# Patient Record
Sex: FEMALE | Race: WHITE | NOT HISPANIC OR LATINO | Employment: OTHER | URBAN - METROPOLITAN AREA
[De-identification: names, ages, dates, MRNs, and addresses within clinical notes are randomized per-mention and may not be internally consistent; named-entity substitution may affect disease eponyms.]

---

## 2019-01-03 RX ORDER — GABAPENTIN 300 MG/1
CAPSULE ORAL
Qty: 270 CAPSULE | Refills: 0 | Status: SHIPPED | OUTPATIENT
Start: 2019-01-03 | End: 2019-01-03 | Stop reason: SDUPTHER

## 2019-01-03 RX ORDER — GABAPENTIN 300 MG/1
CAPSULE ORAL
Qty: 270 CAPSULE | Refills: 0 | Status: SHIPPED | OUTPATIENT
Start: 2019-01-03 | End: 2019-04-15 | Stop reason: SDUPTHER

## 2019-01-03 RX ORDER — GABAPENTIN 300 MG/1
300 CAPSULE ORAL 3 TIMES DAILY
COMMUNITY
End: 2019-01-03 | Stop reason: SDUPTHER

## 2019-01-03 NOTE — TELEPHONE ENCOUNTER
PT CALLED IN REQUESTING A REFILL FOR HER GABAPENTIN 300MG   3BID. PT IS A [facility].   SENT TO [facility]

## 2019-02-07 RX ORDER — CARVEDILOL 25 MG/1
TABLET ORAL
Qty: 180 TABLET | Refills: 3 | Status: SHIPPED | OUTPATIENT
Start: 2019-02-07 | End: 2020-01-28

## 2019-04-09 RX ORDER — LOVASTATIN 40 MG/1
TABLET ORAL
Qty: 90 TABLET | Refills: 1 | Status: SHIPPED | OUTPATIENT
Start: 2019-04-09 | End: 2019-08-28 | Stop reason: SDUPTHER

## 2019-04-15 RX ORDER — GABAPENTIN 300 MG/1
CAPSULE ORAL
Qty: 270 CAPSULE | Refills: 0 | Status: SHIPPED | OUTPATIENT
Start: 2019-04-15 | End: 2019-04-29 | Stop reason: SDUPTHER

## 2019-04-15 NOTE — TELEPHONE ENCOUNTER
DAUGHTER CALLED FOR REFILL GABAPENTIN 300 MG TAKE 1 IN AM AND 2 IN THE PM AND WANTS A 90 DAY  SUPPLY CALLED IN TO THE PHARMACY ON [address] IN [place]. NEEDS MOHSEN

## 2019-04-16 RX ORDER — BLOOD SUGAR DIAGNOSTIC
STRIP MISCELLANEOUS
Qty: 100 EACH | Refills: 11 | Status: SHIPPED | OUTPATIENT
Start: 2019-04-16 | End: 2019-10-02 | Stop reason: SDUPTHER

## 2019-04-16 RX ORDER — GLIPIZIDE 10 MG/1
TABLET ORAL
Qty: 180 TABLET | Refills: 1 | Status: SHIPPED | OUTPATIENT
Start: 2019-04-16 | End: 2019-08-28 | Stop reason: SDUPTHER

## 2019-04-17 PROBLEM — I50.32 CHRONIC DIASTOLIC HEART FAILURE (HCC): Status: ACTIVE | Noted: 2019-04-17

## 2019-04-17 PROBLEM — E78.5 HYPERLIPIDEMIA: Status: ACTIVE | Noted: 2019-04-17

## 2019-04-17 PROBLEM — E11.22 HYPERTENSION ASSOCIATED WITH STAGE 3 CHRONIC KIDNEY DISEASE DUE TO TYPE 2 DIABETES MELLITUS (HCC): Status: ACTIVE | Noted: 2019-04-17

## 2019-04-17 PROBLEM — N18.30 HYPERTENSION ASSOCIATED WITH STAGE 3 CHRONIC KIDNEY DISEASE DUE TO TYPE 2 DIABETES MELLITUS (HCC): Status: ACTIVE | Noted: 2019-04-17

## 2019-04-17 PROBLEM — E11.9 TYPE 2 DIABETES MELLITUS (HCC): Status: ACTIVE | Noted: 2019-04-17

## 2019-04-17 PROBLEM — I35.0 AORTIC VALVE STENOSIS: Status: ACTIVE | Noted: 2019-04-17

## 2019-04-17 PROBLEM — M25.561 KNEE PAIN, RIGHT: Status: ACTIVE | Noted: 2019-04-17

## 2019-04-17 PROBLEM — I25.10 ASHD (ARTERIOSCLEROTIC HEART DISEASE): Status: ACTIVE | Noted: 2019-04-17

## 2019-04-17 PROBLEM — I12.9 HYPERTENSION ASSOCIATED WITH STAGE 3 CHRONIC KIDNEY DISEASE DUE TO TYPE 2 DIABETES MELLITUS (HCC): Status: ACTIVE | Noted: 2019-04-17

## 2019-04-17 PROBLEM — I10 BENIGN ESSENTIAL HYPERTENSION: Status: ACTIVE | Noted: 2019-04-17

## 2019-04-17 PROBLEM — I20.9 ANGINA PECTORIS (HCC): Status: ACTIVE | Noted: 2019-04-17

## 2019-04-17 PROBLEM — E11.21 DIABETIC RENAL DISEASE (HCC): Status: ACTIVE | Noted: 2019-04-17

## 2019-04-17 PROBLEM — Z00.00 ANNUAL PHYSICAL EXAM: Status: ACTIVE | Noted: 2019-04-17

## 2019-04-17 PROBLEM — G62.9 PERIPHERAL NEUROPATHY: Status: ACTIVE | Noted: 2019-04-17

## 2019-04-17 PROBLEM — M10.9 GOUT, UNSPECIFIED: Status: ACTIVE | Noted: 2019-04-17

## 2019-04-17 PROBLEM — E11.42 DIABETIC SENSORY POLYNEUROPATHY (HCC): Status: ACTIVE | Noted: 2019-04-17

## 2019-04-17 PROBLEM — E66.01 MORBID OBESITY WITH BMI OF 40.0-44.9, ADULT (HCC): Status: ACTIVE | Noted: 2019-04-17

## 2019-04-17 PROBLEM — E55.9 VITAMIN D DEFICIENCY: Status: ACTIVE | Noted: 2019-04-17

## 2019-04-17 PROBLEM — I11.9 HYPERTENSIVE HEART DISEASE: Status: ACTIVE | Noted: 2019-04-17

## 2019-04-17 PROBLEM — I87.2 PERIPHERAL VENOUS INSUFFICIENCY: Status: ACTIVE | Noted: 2019-04-17

## 2019-04-29 ENCOUNTER — LAB REQUISITION (OUTPATIENT)
Dept: LAB | Facility: HOSPITAL | Age: 84
End: 2019-04-29

## 2019-04-29 ENCOUNTER — OFFICE VISIT (OUTPATIENT)
Dept: INTERNAL MEDICINE | Facility: CLINIC | Age: 84
End: 2019-04-29

## 2019-04-29 VITALS
HEIGHT: 62 IN | HEART RATE: 60 BPM | WEIGHT: 247 LBS | SYSTOLIC BLOOD PRESSURE: 158 MMHG | BODY MASS INDEX: 45.45 KG/M2 | DIASTOLIC BLOOD PRESSURE: 50 MMHG

## 2019-04-29 DIAGNOSIS — Z00.00 ROUTINE GENERAL MEDICAL EXAMINATION AT A HEALTH CARE FACILITY: ICD-10-CM

## 2019-04-29 DIAGNOSIS — E11.21 DIABETIC NEPHROPATHY ASSOCIATED WITH TYPE 2 DIABETES MELLITUS (HCC): ICD-10-CM

## 2019-04-29 DIAGNOSIS — E55.9 VITAMIN D DEFICIENCY: ICD-10-CM

## 2019-04-29 DIAGNOSIS — I10 BENIGN ESSENTIAL HYPERTENSION: ICD-10-CM

## 2019-04-29 DIAGNOSIS — E78.2 MIXED HYPERLIPIDEMIA: ICD-10-CM

## 2019-04-29 DIAGNOSIS — Z00.00 PREVENTATIVE HEALTH CARE: Primary | ICD-10-CM

## 2019-04-29 DIAGNOSIS — M1A.09X0 IDIOPATHIC CHRONIC GOUT OF MULTIPLE SITES WITHOUT TOPHUS: ICD-10-CM

## 2019-04-29 PROCEDURE — 96160 PT-FOCUSED HLTH RISK ASSMT: CPT | Performed by: INTERNAL MEDICINE

## 2019-04-29 PROCEDURE — 36415 COLL VENOUS BLD VENIPUNCTURE: CPT | Performed by: INTERNAL MEDICINE

## 2019-04-29 PROCEDURE — G0439 PPPS, SUBSEQ VISIT: HCPCS | Performed by: INTERNAL MEDICINE

## 2019-04-29 RX ORDER — BUMETANIDE 2 MG/1
1 TABLET ORAL DAILY
COMMUNITY
Start: 2019-01-28 | End: 2019-12-30 | Stop reason: SDUPTHER

## 2019-04-29 RX ORDER — ASPIRIN 81 MG/1
81 TABLET ORAL DAILY
COMMUNITY

## 2019-04-29 RX ORDER — LANOLIN ALCOHOL/MO/W.PET/CERES
1000 CREAM (GRAM) TOPICAL DAILY
COMMUNITY

## 2019-04-29 RX ORDER — ALLOPURINOL 100 MG/1
1 TABLET ORAL DAILY
COMMUNITY
Start: 2019-03-14 | End: 2019-08-23 | Stop reason: SDUPTHER

## 2019-04-29 RX ORDER — GABAPENTIN 300 MG/1
CAPSULE ORAL
Qty: 360 CAPSULE | Refills: 0 | Status: SHIPPED | OUTPATIENT
Start: 2019-04-29 | End: 2019-09-27 | Stop reason: SDUPTHER

## 2019-04-29 NOTE — PROGRESS NOTES
QUICK REFERENCE INFORMATION:  The ABCs of the Annual Wellness Visit    Subsequent Medicare Wellness Visit    HEALTH RISK ASSESSMENT    1934    Recent Hospitalizations:  No hospitalization(s) within the last year..        Current Medical Providers:  Patient Care Team:  Rodney Covarrubias MD as PCP - Internal Medicine (Internal Medicine)        Smoking Status:  Social History     Tobacco Use   Smoking Status Former Smoker   • Years: 40.00   • Types: Cigarettes   • Last attempt to quit: 1999   • Years since quittin.0   Smokeless Tobacco Never Used       Alcohol Consumption:  Social History     Substance and Sexual Activity   Alcohol Use Not on file       Depression Screen:   PHQ-2/PHQ-9 Depression Screening 2019   Little interest or pleasure in doing things 0   Feeling down, depressed, or hopeless 0   Total Score 0       Health Habits and Functional and Cognitive Screening:  No flowsheet data found.        Does the patient have evidence of cognitive impairment? No            Aspirin use counseling: Taking ASA appropriately as indicated      Recent Lab Results:  CMP:     HbA1c:  No results found for: HGBA1C  Microalbumin:  No results found for: MICROALBUR, POCMALB, POCCREAT  Lipid Panel  No results found for: CHOL, TRIG, HDL, LDL, AST, ALT    Visual Acuity:  No exam data present    Age-appropriate Screening Schedule:  Refer to the list below for future screening recommendations based on patient's age, sex and/or medical conditions. Orders for these recommended tests are listed in the plan section. The patient has been provided with a written plan.    Health Maintenance   Topic Date Due   • URINE MICROALBUMIN  1934   • ZOSTER VACCINE (1 of 2) 10/01/1984   • PNEUMOCOCCAL VACCINES (65+ LOW/MEDIUM RISK) (2 of 2 - PPSV23) 10/06/2016   • HEMOGLOBIN A1C  2019   • LIPID PANEL  2019   • INFLUENZA VACCINE  2019   • TDAP/TD VACCINES (2 - Td) 2024        Subjective   History of  Present Illness    Angela Escalante is a 84 y.o. female who presents for an Subsequent Wellness Visit.  She is also here for follow-up of her diabetes with nephropathy, chronic kidney disease stage III, chronic diastolic heart failure, coronary artery disease and hypertension.  She feels very well.  She lives with her daughter in law and son.  She stays active with activities, going to the [employer] twice a week to play bingo and other activities.  She reads 4-5 books books per week, does crossword puzzles, puzzles and word games.  From a cardiac standpoint she has been asymptomatic.  She occasionally has edema, but is had no chest pain or increased dyspnea.    The following portions of the patient's history were reviewed and updated as appropriate: allergies, current medications, past family history, past medical history, past social history, past surgical history and problem list.    Outpatient Medications Prior to Visit   Medication Sig Dispense Refill   • ACCU-CHEK IRAM PLUS test strip USE  FOR  TESTING ONE TIME DAILY 100 each 11   • allopurinol (ZYLOPRIM) 100 MG tablet Take 1 tablet by mouth Daily.     • aspirin 81 MG EC tablet Take 81 mg by mouth Daily.     • bumetanide (BUMEX) 2 MG tablet Take 1 tablet by mouth Daily.     • carvedilol (COREG) 25 MG tablet TAKE 1 TABLET TWICE DAILY WITH FOOD 180 tablet 3   • fluticasone-salmeterol (ADVAIR) 100-50 MCG/DOSE DISKUS Inhale 1 puff 2 (Two) Times a Day As Needed.     • glipiZIDE (GLUCOTROL) 10 MG tablet TAKE 1 TABLET TWICE DAILY BEFORE MEALS 180 tablet 1   • lovastatin (MEVACOR) 40 MG tablet TAKE 1 TABLET EVERY DAY WITH EVENING MEAL (NEED MD APPOINTMENT FOR REFILLS) 90 tablet 1   • vitamin B-12 (CYANOCOBALAMIN) 1000 MCG tablet Take 1,000 mcg by mouth Daily.     • gabapentin (NEURONTIN) 300 MG capsule Take one capsule every morning and two capsules at bed time 270 capsule 0     No facility-administered medications prior to visit.        Patient Active Problem List    Diagnosis   • Peripheral neuropathy   • Angina pectoris (CMS/[facility])   • Annual physical exam   • Aortic valve stenosis   • ASHD (arteriosclerotic heart disease)   • Benign essential hypertension   • Chronic diastolic heart failure (CMS/HCC)   • Hypertension associated with stage 3 chronic kidney disease due to type 2 diabetes mellitus (CMS/HCC)   • Type 2 diabetes mellitus (CMS/HCC)   • Diabetic renal disease (CMS/[facility])   • Diabetic sensory polyneuropathy (CMS/[facility])   • Gout, unspecified   • Hyperlipidemia   • Knee pain, right   • Morbid obesity with BMI of 40.0-44.9, adult (CMS/[facility])   • Peripheral venous insufficiency   • Hypertensive heart disease   • Vitamin D deficiency       Advance Care Planning:  Patient has an advance directive - a copy has not been provided. Have asked the patient to send this to us to add to record    Identification of Risk Factors:  Risk factors include: weight , unhealthy diet, cardiovascular risk and inactivity.    Review of Systems   Constitutional: Positive for fatigue. Negative for chills and fever.   HENT: Negative for congestion, ear pain and sinus pressure.    Respiratory: Positive for shortness of breath. Negative for cough, chest tightness and wheezing.    Cardiovascular: Negative for chest pain and palpitations.   Gastrointestinal: Negative for abdominal pain, blood in stool and constipation.   Skin: Negative for color change.   Allergic/Immunologic: Negative for environmental allergies.   Neurological: Negative for dizziness, speech difficulty and headaches.   Psychiatric/Behavioral: Negative for confusion. The patient is not nervous/anxious.        Compared to one year ago, the patient feels her physical health is better.  Compared to one year ago, the patient feels her mental health is better.    Objective     Physical Exam   Constitutional: She is oriented to person, place, and time. She appears well-developed and well-nourished. She is obese.  HENT:   Head: Normocephalic and  "atraumatic.   Neck: Normal range of motion. Neck supple. No JVD present. No tracheal deviation present. No thyromegaly present.   Cardiovascular: Normal rate, regular rhythm, normal heart sounds and intact distal pulses. Exam reveals no gallop and no friction rub.   No murmur heard.  Pulmonary/Chest: Effort normal and breath sounds normal.   Abdominal: Soft. Bowel sounds are normal.   Musculoskeletal: She exhibits edema.    Angela had a diabetic foot exam performed today.   During the foot exam she had a monofilament test performed (sensation is intact to monofilament bilaterally).    Neurological Sensory Findings -  Unaltered sharp/dull right ankle/foot discrimination and unaltered sharp/dull left ankle/foot discrimination. No right ankle/foot altered proprioception and no left ankle/foot altered proprioception  Vascular Status -  Her right foot exhibits normal foot vasculature  and no edema. Her left foot exhibits normal foot vasculature  and no edema.  Skin Integrity  -  Her right foot skin is intact.Her left foot skin is intact..  Lymphadenopathy:     She has no cervical adenopathy.   Neurological: She is alert and oriented to person, place, and time. She displays normal reflexes. No cranial nerve deficit or sensory deficit. She exhibits normal muscle tone.   Skin: Skin is warm and dry.   Psychiatric: She has a normal mood and affect. Her behavior is normal. Judgment and thought content normal.   Vitals reviewed.       Procedures     Vitals:    04/29/19 1022   BP: 158/50   BP Location: Right arm   Patient Position: Sitting   Cuff Size: Adult   Pulse: 60   Weight: 112 kg (247 lb)   Height: 156.2 cm (61.5\")       Patient's Body mass index is 45.91 kg/m². BMI is above normal parameters. Recommendations include: exercise counseling and nutrition counseling.      Assessment/Plan   Problem List Items Addressed This Visit        Cardiovascular and Mediastinum    Benign essential hypertension - Primary    Relevant " Medications    carvedilol (COREG) 25 MG tablet    bumetanide (BUMEX) 2 MG tablet    Other Relevant Orders    CBC & Differential    Comprehensive Metabolic Panel    Hyperlipidemia    Relevant Medications    lovastatin (MEVACOR) 40 MG tablet    Other Relevant Orders    Comprehensive Metabolic Panel    Lipid Panel       Digestive    Vitamin D deficiency    Relevant Orders    Vitamin D 25 Hydroxy       Endocrine    Diabetic renal disease (CMS/[facility])    Relevant Medications    glipiZIDE (GLUCOTROL) 10 MG tablet    bumetanide (BUMEX) 2 MG tablet    Other Relevant Orders    Hemoglobin A1c    Comprehensive Metabolic Panel    Microalbumin / Creatinine Urine Ratio - Urine, Clean Catch       Other    Gout, unspecified    Relevant Orders    Uric acid        Patient Self-Management and Personalized Health Advice  The patient has been provided with information about: diet, exercise and weight management and preventive services including:   · Exercise counseling provided, Nutrition counseling provided.    Visit Diagnoses:    ICD-10-CM ICD-9-CM   1. Benign essential hypertension I10 401.1   2. Mixed hyperlipidemia E78.2 272.2   3. Diabetic nephropathy associated with type 2 diabetes mellitus (CMS/[facility]) E11.21 250.40     583.81   4. Vitamin D deficiency E55.9 268.9   5. Idiopathic chronic gout of multiple sites without tophus [id number] 274.02       Orders Placed This Encounter   Procedures   • Hemoglobin A1c     Standing Status:   Future     Number of Occurrences:   1     Standing Expiration Date:   4/29/2020   • Comprehensive Metabolic Panel     Standing Status:   Future     Number of Occurrences:   1     Standing Expiration Date:   4/29/2020   • Lipid Panel     Standing Status:   Future     Number of Occurrences:   1     Standing Expiration Date:   4/29/2020   • Microalbumin / Creatinine Urine Ratio - Urine, Clean Catch     Standing Status:   Future     Number of Occurrences:   1     Standing Expiration Date:   4/29/2020   • Vitamin D  25 Hydroxy     Standing Status:   Future     Number of Occurrences:   1     Standing Expiration Date:   4/29/2020   • Uric acid     Standing Status:   Future     Number of Occurrences:   1     Standing Expiration Date:   4/29/2020   • CBC & Differential     Standing Status:   Future     Number of Occurrences:   1     Standing Expiration Date:   4/29/2020     Order Specific Question:   Manual Differential     Answer:   No       Outpatient Encounter Medications as of 4/29/2019   Medication Sig Dispense Refill   • ACCU-CHEK IRAM PLUS test strip USE  FOR  TESTING ONE TIME DAILY 100 each 11   • allopurinol (ZYLOPRIM) 100 MG tablet Take 1 tablet by mouth Daily.     • aspirin 81 MG EC tablet Take 81 mg by mouth Daily.     • bumetanide (BUMEX) 2 MG tablet Take 1 tablet by mouth Daily.     • carvedilol (COREG) 25 MG tablet TAKE 1 TABLET TWICE DAILY WITH FOOD 180 tablet 3   • fluticasone-salmeterol (ADVAIR) 100-50 MCG/DOSE DISKUS Inhale 1 puff 2 (Two) Times a Day As Needed.     • gabapentin (NEURONTIN) 300 MG capsule Take two capsules every morning and two capsules at bed time 360 capsule 0   • glipiZIDE (GLUCOTROL) 10 MG tablet TAKE 1 TABLET TWICE DAILY BEFORE MEALS 180 tablet 1   • lovastatin (MEVACOR) 40 MG tablet TAKE 1 TABLET EVERY DAY WITH EVENING MEAL (NEED MD APPOINTMENT FOR REFILLS) 90 tablet 1   • vitamin B-12 (CYANOCOBALAMIN) 1000 MCG tablet Take 1,000 mcg by mouth Daily.     • [DISCONTINUED] gabapentin (NEURONTIN) 300 MG capsule Take one capsule every morning and two capsules at bed time 270 capsule 0     No facility-administered encounter medications on file as of 4/29/2019.        Reviewed use of high risk medication in the elderly: yes  Reviewed for potential of harmful drug interactions in the elderly: yes    Follow Up:  Return in about 6 months (around 10/29/2019), or october, fasting, for follow up fasting, labs today.     An After Visit Summary and PPPS with all of these plans were given to the patient.

## 2019-05-17 LAB
25(OH)D3+25(OH)D2 SERPL-MCNC: NORMAL NG/ML
ALBUMIN SERPL-MCNC: NORMAL G/DL
ALBUMIN/CREAT UR: 100.7 MG/G CREAT (ref 0–30)
ALP SERPL-CCNC: NORMAL U/L
ALT SERPL-CCNC: NORMAL U/L
AST SERPL-CCNC: NORMAL U/L
BASOPHILS # BLD AUTO: (no result) 10*3/UL
BILIRUB SERPL-MCNC: NORMAL MG/DL
BUN SERPL-MCNC: NORMAL MG/DL
CALCIUM SERPL-MCNC: NORMAL MG/DL
CHLORIDE SERPL-SCNC: NORMAL MMOL/L
CHOLEST SERPL-MCNC: NORMAL MG/DL
CO2 SERPL-SCNC: NORMAL MMOL/L
CREAT SERPL-MCNC: NORMAL MG/DL
CREAT UR-MCNC: 30.2 MG/DL
EOSINOPHIL # BLD AUTO: (no result) 10*3/UL
EOSINOPHIL NFR BLD AUTO: (no result) %
GLUCOSE SERPL-MCNC: NORMAL MG/DL
HBA1C MFR BLD: 8.5 % (ref 4.8–5.6)
HCT VFR BLD AUTO: (no result) %
HDLC SERPL-MCNC: NORMAL MG/DL
HGB BLD-MCNC: (no result) G/DL
LYMPHOCYTES # BLD AUTO: (no result) 10*3/UL
LYMPHOCYTES NFR BLD AUTO: (no result) %
MICROALBUMIN UR-MCNC: 30.4 UG/ML
MONOCYTES NFR BLD AUTO: (no result) %
NEUTROPHILS NFR BLD AUTO: (no result) %
PLATELET # BLD AUTO: (no result) 10*3/UL
POTASSIUM SERPL-SCNC: NORMAL MMOL/L
PROT SERPL-MCNC: NORMAL G/DL
RBC # BLD AUTO: (no result) 10*6/UL
SODIUM SERPL-SCNC: NORMAL MMOL/L
SPECIMEN STATUS: NORMAL
TRIGL SERPL-MCNC: NORMAL MG/DL
URATE SERPL-MCNC: 8.1 MG/DL (ref 2.5–7.1)
WBC # BLD AUTO: (no result) X10E3/UL

## 2019-07-30 RX ORDER — ALLOPURINOL 100 MG/1
TABLET ORAL
Qty: 90 TABLET | Refills: 3 | OUTPATIENT
Start: 2019-07-30

## 2019-08-23 RX ORDER — ALLOPURINOL 100 MG/1
100 TABLET ORAL DAILY
Qty: 90 TABLET | Refills: 0 | Status: SHIPPED | OUTPATIENT
Start: 2019-08-23 | End: 2019-10-29 | Stop reason: SDUPTHER

## 2019-08-23 NOTE — TELEPHONE ENCOUNTER
PT CALLED ASKING FOR A NEW SCRIPT SENT IN FOR ALLOPURINOL 100 MG TABLETS. PT ALSO ASKED FOR A 3 MONTH SUPPLY. PLEASE ADVISE. THANKS

## 2019-08-28 RX ORDER — GLIPIZIDE 10 MG/1
TABLET ORAL
Qty: 180 TABLET | Refills: 1 | Status: SHIPPED | OUTPATIENT
Start: 2019-08-28 | End: 2020-09-16 | Stop reason: SDUPTHER

## 2019-08-28 RX ORDER — LOVASTATIN 40 MG/1
40 TABLET ORAL NIGHTLY
Qty: 90 TABLET | Refills: 1 | Status: SHIPPED | OUTPATIENT
Start: 2019-08-28 | End: 2020-05-21

## 2019-09-27 RX ORDER — GABAPENTIN 300 MG/1
CAPSULE ORAL
Qty: 360 CAPSULE | Refills: 0 | Status: SHIPPED | OUTPATIENT
Start: 2019-09-27 | End: 2019-12-30 | Stop reason: SDUPTHER

## 2019-10-02 ENCOUNTER — TELEPHONE (OUTPATIENT)
Dept: INTERNAL MEDICINE | Facility: CLINIC | Age: 84
End: 2019-10-02

## 2019-10-02 NOTE — TELEPHONE ENCOUNTER
Pt's daughter wanting to know the status of pt's Gabapentin she said pharmacy told pt they didn't have it   I informed her it was sent in on 9/27/19   She is going to call pharmacy

## 2019-10-28 ENCOUNTER — LAB REQUISITION (OUTPATIENT)
Dept: LAB | Facility: HOSPITAL | Age: 84
End: 2019-10-28

## 2019-10-28 ENCOUNTER — OFFICE VISIT (OUTPATIENT)
Dept: INTERNAL MEDICINE | Facility: CLINIC | Age: 84
End: 2019-10-28

## 2019-10-28 VITALS
DIASTOLIC BLOOD PRESSURE: 52 MMHG | HEART RATE: 56 BPM | SYSTOLIC BLOOD PRESSURE: 146 MMHG | HEIGHT: 61 IN | WEIGHT: 242 LBS | BODY MASS INDEX: 45.69 KG/M2

## 2019-10-28 DIAGNOSIS — I50.32 CHRONIC DIASTOLIC HEART FAILURE (HCC): ICD-10-CM

## 2019-10-28 DIAGNOSIS — I10 BENIGN ESSENTIAL HYPERTENSION: ICD-10-CM

## 2019-10-28 DIAGNOSIS — E66.01 MORBID OBESITY WITH BMI OF 40.0-44.9, ADULT (HCC): ICD-10-CM

## 2019-10-28 DIAGNOSIS — N18.30 CHRONIC KIDNEY DISEASE, STAGE III (MODERATE) (HCC): ICD-10-CM

## 2019-10-28 DIAGNOSIS — E78.2 MIXED HYPERLIPIDEMIA: ICD-10-CM

## 2019-10-28 DIAGNOSIS — E55.9 VITAMIN D DEFICIENCY: ICD-10-CM

## 2019-10-28 DIAGNOSIS — E11.42 DIABETIC SENSORY POLYNEUROPATHY (HCC): ICD-10-CM

## 2019-10-28 DIAGNOSIS — Z00.00 ROUTINE GENERAL MEDICAL EXAMINATION AT A HEALTH CARE FACILITY: ICD-10-CM

## 2019-10-28 DIAGNOSIS — E11.21 DIABETIC NEPHROPATHY ASSOCIATED WITH TYPE 2 DIABETES MELLITUS (HCC): Primary | ICD-10-CM

## 2019-10-28 DIAGNOSIS — I20.9 ANGINA PECTORIS (HCC): ICD-10-CM

## 2019-10-28 PROCEDURE — 90653 IIV ADJUVANT VACCINE IM: CPT | Performed by: INTERNAL MEDICINE

## 2019-10-28 PROCEDURE — 36415 COLL VENOUS BLD VENIPUNCTURE: CPT | Performed by: INTERNAL MEDICINE

## 2019-10-28 PROCEDURE — G0008 ADMIN INFLUENZA VIRUS VAC: HCPCS | Performed by: INTERNAL MEDICINE

## 2019-10-28 PROCEDURE — 99214 OFFICE O/P EST MOD 30 MIN: CPT | Performed by: INTERNAL MEDICINE

## 2019-10-28 RX ORDER — ALBUTEROL SULFATE 90 UG/1
2 AEROSOL, METERED RESPIRATORY (INHALATION) DAILY PRN
COMMUNITY
Start: 2018-04-16

## 2019-10-28 NOTE — PROGRESS NOTES
[facility]     Angela Escalante  1934   [id number]      Patient Care Team:  Rodney Covarrubias MD as PCP - Internal Medicine (Internal Medicine)    Chief Complaint::   Chief Complaint   Patient presents with   • Hyperlipidemia   • Hypertension   • Diabetes        HPI  Ms. Escalante is now 85.  She comes in for follow-up of her diabetes with nephropathy and neuropathy, hypertension, hyperlipidemia, chronic diastolic heart failure, angina pectoris and morbid obesity.  She states she feels very well.  She remains active, has no chest pain or dyspnea unless she gets in a hurry.  Her only complaint is discomfort in her feet.  Gabapentin helps minimize this.  She is wearing her compression which controls her edema.    Chronic Conditions:      Patient Active Problem List   Diagnosis   • Peripheral neuropathy   • Angina pectoris (CMS/HCC)   • Annual physical exam   • Aortic valve stenosis   • ASHD (arteriosclerotic heart disease)   • Benign essential hypertension   • Chronic diastolic heart failure (CMS/HCC)   • Hypertension associated with stage 3 chronic kidney disease due to type 2 diabetes mellitus (CMS/HCC)   • Type 2 diabetes mellitus (CMS/HCC)   • Diabetic renal disease (CMS/HCC)   • Diabetic sensory polyneuropathy (CMS/HCC)   • Gout, unspecified   • Hyperlipidemia   • Knee pain, right   • Morbid obesity with BMI of 40.0-44.9, adult (CMS/HCC)   • Peripheral venous insufficiency   • Hypertensive heart disease   • Vitamin D deficiency   • Chronic kidney disease, stage III (moderate) (CMS/HCC)        Past Medical History:   Diagnosis Date   • Cancer, colon (CMS/HCC)    • Coronary artery disease        Past Surgical History:   Procedure Laterality Date   • CORONARY STENT PLACEMENT  2005    stent of 90% LAD   • HEMICOLECTOMY  2009    L- lap   • OTHER SURGICAL HISTORY  1971    Bilateral tubal ligation       Family History   Problem Relation Age of Onset   • Colon cancer Sister    • Colon cancer Brother         Social History     Socioeconomic History   • Marital status:      Spouse name: Not on file   • Number of children: Not on file   • Years of education: Not on file   • Highest education level: Not on file   Tobacco Use   • Smoking status: Former Smoker     Years: 40.00     Types: Cigarettes     Last attempt to quit: 1999     Years since quittin.5   • Smokeless tobacco: Never Used       Allergies   Allergen Reactions   • Lisinopril Cough         Current Outpatient Medications:   •  albuterol sulfate HFA (VENTOLIN HFA) 108 (90 Base) MCG/ACT inhaler, Inhale 2 puffs Daily As Needed., Disp: , Rfl:   •  allopurinol (ZYLOPRIM) 100 MG tablet, Take 1 tablet by mouth Daily., Disp: 90 tablet, Rfl: 0  •  aspirin 81 MG EC tablet, Take 81 mg by mouth Daily., Disp: , Rfl:   •  bumetanide (BUMEX) 2 MG tablet, Take 1 tablet by mouth Daily., Disp: , Rfl:   •  carvedilol (COREG) 25 MG tablet, TAKE 1 TABLET TWICE DAILY WITH FOOD, Disp: 180 tablet, Rfl: 3  •  Cholecalciferol (VITAMIN D-3 PO), Take 1,000 Units by mouth Daily., Disp: , Rfl:   •  fluticasone-salmeterol (ADVAIR) 100-50 MCG/DOSE DISKUS, Inhale 1 puff 2 (Two) Times a Day As Needed., Disp: , Rfl:   •  gabapentin (NEURONTIN) 300 MG capsule, Take two capsules every morning and two capsules at bed time, Disp: 360 capsule, Rfl: 0  •  glipiZIDE (GLUCOTROL) 10 MG tablet, TAKE 1 TABLET TWICE DAILY BEFORE MEALS, Disp: 180 tablet, Rfl: 1  •  glucose blood (ACCU-CHEK IRAM PLUS) test strip, Use daily, Disp: 100 each, Rfl: 11  •  lovastatin (MEVACOR) 40 MG tablet, Take 1 tablet by mouth Every Night., Disp: 90 tablet, Rfl: 1  •  vitamin B-12 (CYANOCOBALAMIN) 1000 MCG tablet, Take 1,000 mcg by mouth Daily., Disp: , Rfl:     Review of Systems   Constitutional: Negative for chills, fatigue and fever.   HENT: Negative for congestion, ear pain and sinus pressure.    Respiratory: Negative for cough, chest tightness, shortness of breath and wheezing.    Cardiovascular:  "Negative for chest pain and palpitations.   Gastrointestinal: Negative for abdominal pain, blood in stool and constipation.   Skin: Negative for color change.   Allergic/Immunologic: Negative for environmental allergies.   Neurological: Negative for dizziness, speech difficulty and headache.   Psychiatric/Behavioral: Negative for decreased concentration. The patient is not nervous/anxious.         Vital Signs  Vitals:    10/28/19 1120   BP: 146/52   BP Location: Left arm   Patient Position: Sitting   Cuff Size: Adult   Pulse: 56   Weight: 110 kg (242 lb)   Height: 156.2 cm (61.5\")   PainSc: 0-No pain       Physical Exam   Constitutional: She is oriented to person, place, and time. She appears well-developed and well-nourished. She is morbidly obese.  HENT:   Head: Normocephalic and atraumatic.   Cardiovascular: Normal rate, regular rhythm and normal heart sounds.   No murmur heard.  Pulmonary/Chest: Effort normal and breath sounds normal.   Neurological: She is alert and oriented to person, place, and time.   Psychiatric: She has a normal mood and affect.   Vitals reviewed.     Procedures    ACE III MINI             Assessment/Plan:    Angela was seen today for hyperlipidemia, hypertension and diabetes.    Diagnoses and all orders for this visit:    Diabetic nephropathy associated with type 2 diabetes mellitus (CMS/HCC)    Benign essential hypertension  -     CBC & Differential; Future  -     Comprehensive Metabolic Panel; Future  -     Comprehensive Metabolic Panel  -     CBC & Differential    Mixed hyperlipidemia  -     Lipid Panel; Future  -     Lipid Panel    Vitamin D deficiency  -     Vitamin D 25 Hydroxy; Future  -     Vitamin D 25 Hydroxy    Diabetic sensory polyneuropathy (CMS/HCC)  -     Comprehensive Metabolic Panel; Future  -     Hemoglobin A1c; Future  -     Hemoglobin A1c  -     Comprehensive Metabolic Panel    Chronic diastolic heart failure (CMS/HCC)    Angina pectoris (CMS/HCC)    Morbid obesity " with BMI of 40.0-44.9, adult (CMS/[facility])    Chronic kidney disease, stage III (moderate) (CMS/[facility])    Other orders  -     Fluad Tri 65yr+    Plan    A1c is pending.  Continue glipizide and efforts at healthy diet.    Blood pressure is well controlled on carvedilol.    Lipid panel is pending, continue efforts at low-fat diet.  Continue lovastatin.    Vitamin D level is pending.  She will continue with thousand units daily.    Diastolic heart failure is compensated, continue bumetanide and carvedilol.    Angina is asymptomatic on aspirin, lovastatin, and carvedilol.    BMI is 45 which is down a few pounds.  Encouraged her to continue efforts at reducing calories to lose weight.    She will continue gabapentin at the current dose for her painful neuropathy.  GFR is pending,  Treatment remains control of blood pressure and glucose and avoidance of NSAIDs.      Plan of care reviewed with patient at the conclusion of today's visit. Education was provided regarding diagnosis, management, and any prescribed or recommended OTC medications.Patient verbalizes understanding of and agreement with management plan.         Rodney Covarrubias MD

## 2019-10-29 LAB
25(OH)D3+25(OH)D2 SERPL-MCNC: 35.5 NG/ML (ref 30–100)
ALBUMIN SERPL-MCNC: 4.2 G/DL (ref 3.5–5.2)
ALBUMIN/GLOB SERPL: 1.6 G/DL
ALP SERPL-CCNC: 103 U/L (ref 39–117)
ALT SERPL-CCNC: 11 U/L (ref 1–33)
AST SERPL-CCNC: 12 U/L (ref 1–32)
BASOPHILS # BLD AUTO: 0.06 10*3/MM3 (ref 0–0.2)
BASOPHILS NFR BLD AUTO: 0.7 % (ref 0–1.5)
BILIRUB SERPL-MCNC: 0.2 MG/DL (ref 0.2–1.2)
BUN SERPL-MCNC: 42 MG/DL (ref 8–23)
BUN/CREAT SERPL: 28.6 (ref 7–25)
CALCIUM SERPL-MCNC: 9.5 MG/DL (ref 8.6–10.5)
CHLORIDE SERPL-SCNC: 103 MMOL/L (ref 98–107)
CHOLEST SERPL-MCNC: 136 MG/DL (ref 0–200)
CO2 SERPL-SCNC: 29.8 MMOL/L (ref 22–29)
CREAT SERPL-MCNC: 1.47 MG/DL (ref 0.57–1)
EOSINOPHIL # BLD AUTO: 0.19 10*3/MM3 (ref 0–0.4)
EOSINOPHIL NFR BLD AUTO: 2.4 % (ref 0.3–6.2)
ERYTHROCYTE [DISTWIDTH] IN BLOOD BY AUTOMATED COUNT: 14.1 % (ref 12.3–15.4)
GLOBULIN SER CALC-MCNC: 2.7 GM/DL
GLUCOSE SERPL-MCNC: 152 MG/DL (ref 65–99)
HBA1C MFR BLD: 8.3 % (ref 4.8–5.6)
HCT VFR BLD AUTO: 38.2 % (ref 34–46.6)
HDLC SERPL-MCNC: 54 MG/DL (ref 40–60)
HGB BLD-MCNC: 12.2 G/DL (ref 12–15.9)
IMM GRANULOCYTES # BLD AUTO: 0.03 10*3/MM3 (ref 0–0.05)
IMM GRANULOCYTES NFR BLD AUTO: 0.4 % (ref 0–0.5)
LDLC SERPL CALC-MCNC: 57 MG/DL (ref 0–100)
LYMPHOCYTES # BLD AUTO: 2.14 10*3/MM3 (ref 0.7–3.1)
LYMPHOCYTES NFR BLD AUTO: 26.7 % (ref 19.6–45.3)
MCH RBC QN AUTO: 29 PG (ref 26.6–33)
MCHC RBC AUTO-ENTMCNC: 31.9 G/DL (ref 31.5–35.7)
MCV RBC AUTO: 90.7 FL (ref 79–97)
MONOCYTES # BLD AUTO: 0.7 10*3/MM3 (ref 0.1–0.9)
MONOCYTES NFR BLD AUTO: 8.7 % (ref 5–12)
NEUTROPHILS # BLD AUTO: 4.91 10*3/MM3 (ref 1.7–7)
NEUTROPHILS NFR BLD AUTO: 61.1 % (ref 42.7–76)
NRBC BLD AUTO-RTO: 0 /100 WBC (ref 0–0.2)
PLATELET # BLD AUTO: 265 10*3/MM3 (ref 140–450)
POTASSIUM SERPL-SCNC: 5.3 MMOL/L (ref 3.5–5.2)
PROT SERPL-MCNC: 6.9 G/DL (ref 6–8.5)
RBC # BLD AUTO: 4.21 10*6/MM3 (ref 3.77–5.28)
SODIUM SERPL-SCNC: 147 MMOL/L (ref 136–145)
TRIGL SERPL-MCNC: 123 MG/DL (ref 0–150)
VLDLC SERPL CALC-MCNC: 24.6 MG/DL
WBC # BLD AUTO: 8.03 10*3/MM3 (ref 3.4–10.8)

## 2019-10-29 RX ORDER — ALLOPURINOL 100 MG/1
TABLET ORAL
Qty: 90 TABLET | Refills: 3 | Status: SHIPPED | OUTPATIENT
Start: 2019-10-29 | End: 2020-09-16 | Stop reason: SDUPTHER

## 2019-11-22 ENCOUNTER — TELEPHONE (OUTPATIENT)
Dept: INTERNAL MEDICINE | Facility: CLINIC | Age: 84
End: 2019-11-22

## 2019-11-25 RX ORDER — CARVEDILOL 25 MG/1
TABLET ORAL
Qty: 180 TABLET | Refills: 0 | OUTPATIENT
Start: 2019-11-25

## 2019-12-30 DIAGNOSIS — G62.9 NEUROPATHY: Primary | ICD-10-CM

## 2019-12-30 RX ORDER — GABAPENTIN 300 MG/1
CAPSULE ORAL
Qty: 360 CAPSULE | Refills: 0 | Status: SHIPPED | OUTPATIENT
Start: 2019-12-30 | End: 2020-01-03 | Stop reason: SDUPTHER

## 2019-12-30 RX ORDER — BUMETANIDE 2 MG/1
2 TABLET ORAL DAILY
Qty: 90 TABLET | Refills: 3 | Status: SHIPPED | OUTPATIENT
Start: 2019-12-30 | End: 2020-09-16 | Stop reason: SDUPTHER

## 2020-01-01 DIAGNOSIS — G62.9 NEUROPATHY: ICD-10-CM

## 2020-01-02 RX ORDER — GABAPENTIN 300 MG/1
CAPSULE ORAL
Qty: 360 CAPSULE | Refills: 0 | OUTPATIENT
Start: 2020-01-02

## 2020-01-03 DIAGNOSIS — G62.9 NEUROPATHY: ICD-10-CM

## 2020-01-03 RX ORDER — GABAPENTIN 300 MG/1
CAPSULE ORAL
Qty: 20 CAPSULE | Refills: 0 | Status: SHIPPED | OUTPATIENT
Start: 2020-01-03 | End: 2020-01-17 | Stop reason: SDUPTHER

## 2020-01-03 NOTE — TELEPHONE ENCOUNTER
Patient;s daughter is calling stating she requested a refill on gabapentin and it was sent through [facility] mail order pharmacy. Patient's daughter called [facility] and told her that it will take about 10 days to get to her,but patient will be out about 4 days before it should arrive. Patient's daughter is asking to see if we can send 4 pills to the [facility] pharmacy on [address].    Patient's daughter Consuelo is on [employer] and can be reached at

## 2020-01-17 ENCOUNTER — TELEPHONE (OUTPATIENT)
Dept: INTERNAL MEDICINE | Facility: CLINIC | Age: 85
End: 2020-01-17

## 2020-01-17 DIAGNOSIS — G62.9 NEUROPATHY: ICD-10-CM

## 2020-01-17 RX ORDER — GABAPENTIN 300 MG/1
CAPSULE ORAL
Qty: 90 CAPSULE | Refills: 2 | Status: SHIPPED | OUTPATIENT
Start: 2020-01-17 | End: 2020-01-17 | Stop reason: SDUPTHER

## 2020-01-17 RX ORDER — GABAPENTIN 300 MG/1
CAPSULE ORAL
Qty: 120 CAPSULE | Refills: 2 | OUTPATIENT
Start: 2020-01-17 | End: 2020-05-11 | Stop reason: SDUPTHER

## 2020-01-17 NOTE — TELEPHONE ENCOUNTER
PT NEEDS REFILL ON  gabapentin (NEURONTIN) 300 MG capsule    PT IS OUT ON MEDICATION     CONF[facility]MED [facility]

## 2020-01-17 NOTE — TELEPHONE ENCOUNTER
Mars with [facility] called on behalf of pt. Mars called regarding pt Rx:     gabapentin (NEURONTIN) 300 MG capsule    Mars needed clarification on dosage and frequency of this Rx.    Please contact pharmacy @ [phone].

## 2020-01-17 NOTE — TELEPHONE ENCOUNTER
Called pharmacy and spoke with Michelle. She verbalized understanding.     Please sign the updated order. I did it as a phone in if you will approve that.

## 2020-01-28 RX ORDER — CARVEDILOL 25 MG/1
TABLET ORAL
Qty: 180 TABLET | Refills: 3 | Status: SHIPPED | OUTPATIENT
Start: 2020-01-28

## 2020-05-11 DIAGNOSIS — G62.9 NEUROPATHY: ICD-10-CM

## 2020-05-11 RX ORDER — GABAPENTIN 300 MG/1
CAPSULE ORAL
Qty: 120 CAPSULE | Refills: 2 | Status: SHIPPED | OUTPATIENT
Start: 2020-05-11 | End: 2020-09-16 | Stop reason: SDUPTHER

## 2020-05-11 RX ORDER — GABAPENTIN 300 MG/1
CAPSULE ORAL
Qty: 120 CAPSULE | Refills: 2 | OUTPATIENT
Start: 2020-05-11 | End: 2020-05-11 | Stop reason: SDUPTHER

## 2020-05-11 NOTE — TELEPHONE ENCOUNTER
Patient's daughter Consuelo requested a refill of gabapentin (NEURONTIN) 300 MG capsule. Patient would like a 90 day supply if possible.    Please call and advise. Consuelo's call back [phone]    [facility] [address] [place], [place] - [address] [facility] - [phone]  - [phone] FX

## 2020-05-21 RX ORDER — LOVASTATIN 40 MG/1
TABLET ORAL
Qty: 90 TABLET | Refills: 1 | Status: SHIPPED | OUTPATIENT
Start: 2020-05-21 | End: 2020-09-16 | Stop reason: SDUPTHER

## 2020-09-16 ENCOUNTER — LAB REQUISITION (OUTPATIENT)
Dept: LAB | Facility: HOSPITAL | Age: 85
End: 2020-09-16

## 2020-09-16 ENCOUNTER — OFFICE VISIT (OUTPATIENT)
Dept: INTERNAL MEDICINE | Facility: CLINIC | Age: 85
End: 2020-09-16

## 2020-09-16 VITALS
WEIGHT: 247.6 LBS | HEART RATE: 56 BPM | SYSTOLIC BLOOD PRESSURE: 146 MMHG | BODY MASS INDEX: 46.75 KG/M2 | HEIGHT: 61 IN | TEMPERATURE: 97.3 F | DIASTOLIC BLOOD PRESSURE: 54 MMHG

## 2020-09-16 DIAGNOSIS — G62.9 NEUROPATHY: ICD-10-CM

## 2020-09-16 DIAGNOSIS — I50.32 CHRONIC DIASTOLIC HEART FAILURE (HCC): ICD-10-CM

## 2020-09-16 DIAGNOSIS — M1A.09X0 IDIOPATHIC CHRONIC GOUT OF MULTIPLE SITES WITHOUT TOPHUS: ICD-10-CM

## 2020-09-16 DIAGNOSIS — I20.9 ANGINA PECTORIS (HCC): ICD-10-CM

## 2020-09-16 DIAGNOSIS — E55.9 VITAMIN D DEFICIENCY: ICD-10-CM

## 2020-09-16 DIAGNOSIS — N18.30 CHRONIC KIDNEY DISEASE, STAGE III (MODERATE) (HCC): ICD-10-CM

## 2020-09-16 DIAGNOSIS — E78.2 MIXED HYPERLIPIDEMIA: ICD-10-CM

## 2020-09-16 DIAGNOSIS — E11.21 DIABETIC NEPHROPATHY ASSOCIATED WITH TYPE 2 DIABETES MELLITUS (HCC): ICD-10-CM

## 2020-09-16 DIAGNOSIS — Z00.00 ROUTINE GENERAL MEDICAL EXAMINATION AT A HEALTH CARE FACILITY: ICD-10-CM

## 2020-09-16 DIAGNOSIS — E11.42 DIABETIC SENSORY POLYNEUROPATHY (HCC): ICD-10-CM

## 2020-09-16 DIAGNOSIS — E66.01 MORBID OBESITY WITH BMI OF 40.0-44.9, ADULT (HCC): ICD-10-CM

## 2020-09-16 DIAGNOSIS — I10 BENIGN ESSENTIAL HYPERTENSION: ICD-10-CM

## 2020-09-16 DIAGNOSIS — Z00.00 ANNUAL PHYSICAL EXAM: Primary | ICD-10-CM

## 2020-09-16 PROCEDURE — 96160 PT-FOCUSED HLTH RISK ASSMT: CPT | Performed by: INTERNAL MEDICINE

## 2020-09-16 PROCEDURE — 90694 VACC AIIV4 NO PRSRV 0.5ML IM: CPT | Performed by: INTERNAL MEDICINE

## 2020-09-16 PROCEDURE — 36415 COLL VENOUS BLD VENIPUNCTURE: CPT

## 2020-09-16 PROCEDURE — 99397 PER PM REEVAL EST PAT 65+ YR: CPT | Performed by: INTERNAL MEDICINE

## 2020-09-16 PROCEDURE — G0008 ADMIN INFLUENZA VIRUS VAC: HCPCS | Performed by: INTERNAL MEDICINE

## 2020-09-16 PROCEDURE — G0439 PPPS, SUBSEQ VISIT: HCPCS | Performed by: INTERNAL MEDICINE

## 2020-09-16 RX ORDER — LOVASTATIN 40 MG/1
40 TABLET ORAL NIGHTLY
Qty: 90 TABLET | Refills: 1 | Status: SHIPPED | OUTPATIENT
Start: 2020-09-16

## 2020-09-16 RX ORDER — BUMETANIDE 2 MG/1
2 TABLET ORAL DAILY
Qty: 90 TABLET | Refills: 3 | Status: SHIPPED | OUTPATIENT
Start: 2020-09-16

## 2020-09-16 RX ORDER — ALLOPURINOL 100 MG/1
100 TABLET ORAL DAILY
Qty: 90 TABLET | Refills: 3 | Status: SHIPPED | OUTPATIENT
Start: 2020-09-16

## 2020-09-16 RX ORDER — GABAPENTIN 300 MG/1
CAPSULE ORAL
Qty: 360 CAPSULE | Refills: 0 | Status: SHIPPED | OUTPATIENT
Start: 2020-09-16

## 2020-09-16 RX ORDER — GLIPIZIDE 10 MG/1
10 TABLET ORAL
Qty: 180 TABLET | Refills: 3 | Status: SHIPPED | OUTPATIENT
Start: 2020-09-16

## 2020-09-16 NOTE — PROGRESS NOTES
QUICK REFERENCE INFORMATION:  The ABCs of the Annual Wellness Visit    Subsequent Medicare Wellness Visit    HEALTH RISK ASSESSMENT    1934    Recent Hospitalizations:  No hospitalization(s) within the last year..        Current Medical Providers:  Patient Care Team:  Rodney Covarrubias MD as PCP - Internal Medicine (Internal Medicine)        Smoking Status:  Social History     Tobacco Use   Smoking Status Former Smoker   • Packs/day: 0.50   • Years: 40.00   • Pack years: 20.00   • Types: Cigarettes   • Quit date: 1999   • Years since quittin.4   Smokeless Tobacco Never Used       Alcohol Consumption:  Social History     Substance and Sexual Activity   Alcohol Use None       Depression Screen:   PHQ-2/PHQ-9 Depression Screening 2020   Little interest or pleasure in doing things 0   Feeling down, depressed, or hopeless 0   Total Score 0       Health Habits and Functional and Cognitive Screening:  Functional & Cognitive Status 2020   Do you have difficulty preparing food and eating? No   Do you have difficulty bathing yourself, getting dressed or grooming yourself? No   Do you have difficulty using the toilet? No   Do you have difficulty moving around from place to place? No   Do you have trouble with steps or getting out of a bed or a chair? Yes   Current Diet Well Balanced Diet   Dental Exam Not up to date   Eye Exam Up to date   Exercise (times per week) 0 times per week   Current Exercise Activities Include None   Do you need help using the phone?  No   Are you deaf or do you have serious difficulty hearing?  No   Do you need help with transportation? No   Do you need help shopping? No   Do you need help preparing meals?  No   Do you need help with housework?  No   Do you need help with laundry? No   Do you need help taking your medications? No   Do you need help managing money? No   Do you ever drive or ride in a car without wearing a seat belt? No   Have you felt unusual stress,  anger or loneliness in the last month? No   Who do you live with? Child   If you need help, do you have trouble finding someone available to you? No   Have you been bothered in the last four weeks by sexual problems? No   Do you have difficulty concentrating, remembering or making decisions? No       Fall Risk Screen:  KRISTINE Fall Risk Assessment was completed, and patient is at MODERATE risk for falls. Assessment completed on:9/16/2020    ACE III MINI        Does the patient have evidence of cognitive impairment? Yes    Aspirin use counseling: Taking ASA appropriately as indicated    Recent Lab Results:  CMP:  Lab Results   Component Value Date     (H) 10/28/2019    BUN 42 (H) 10/28/2019    CREATININE 1.47 (H) 10/28/2019    EGFRIFNONA 34 (L) 10/28/2019    EGFRIFAFRI 41 (L) 10/28/2019    BCR 28.6 (H) 10/28/2019     (H) 10/28/2019    K 5.3 (H) 10/28/2019    CO2 29.8 (H) 10/28/2019    CALCIUM 9.5 10/28/2019    PROTENTOTREF 6.9 10/28/2019    ALBUMIN 4.20 10/28/2019    LABGLOBREF 2.7 10/28/2019    LABIL2 1.6 10/28/2019    BILITOT 0.2 10/28/2019    ALKPHOS 103 10/28/2019    AST 12 10/28/2019    ALT 11 10/28/2019     HbA1c:  Lab Results   Component Value Date    HGBA1C 8.30 (H) 10/28/2019    HGBA1C 8.5 (H) 04/29/2019     Microalbumin:  Lab Results   Component Value Date    MICROALBUR 30.4 04/29/2019     Lipid Panel  Lab Results   Component Value Date    TRIG 123 10/28/2019    HDL 54 10/28/2019    LDL 57 10/28/2019    AST 12 10/28/2019    ALT 11 10/28/2019       Visual Acuity:  No exam data present    Age-appropriate Screening Schedule:  Refer to the list below for future screening recommendations based on patient's age, sex and/or medical conditions. Orders for these recommended tests are listed in the plan section. The patient has been provided with a written plan.    Health Maintenance   Topic Date Due   • ZOSTER VACCINE (1 of 2) 10/01/1984   • DIABETIC EYE EXAM  01/03/2019   • HEMOGLOBIN A1C  04/28/2020    • URINE MICROALBUMIN  04/29/2020   • INFLUENZA VACCINE  08/01/2020   • LIPID PANEL  10/28/2020   • TDAP/TD VACCINES (2 - Td) 03/11/2024        Subjective   History of Present Illness    Angela Escalante is a 85 y.o. female who presents for a Subsequent Wellness Visit.    CHRONIC CONDITIONS    The following portions of the patient's history were reviewed and updated as appropriate: allergies, current medications, past family history, past medical history, past social history, past surgical history and problem list.    Outpatient Medications Prior to Visit   Medication Sig Dispense Refill   • albuterol sulfate HFA (VENTOLIN HFA) 108 (90 Base) MCG/ACT inhaler Inhale 2 puffs Daily As Needed.     • aspirin 81 MG EC tablet Take 81 mg by mouth Daily.     • Blood Gluc Meter Disp-Strips device 1 Device Daily. 1 each 0   • carvedilol (COREG) 25 MG tablet TAKE 1 TABLET TWICE DAILY WITH FOOD 180 tablet 3   • Cholecalciferol (VITAMIN D-3 PO) Take 1,000 Units by mouth Daily.     • fluticasone-salmeterol (ADVAIR) 100-50 MCG/DOSE DISKUS Inhale 1 puff 2 (Two) Times a Day As Needed.     • glucose blood (ACCU-CHEK IRAM PLUS) test strip Use daily 100 each 11   • vitamin B-12 (CYANOCOBALAMIN) 1000 MCG tablet Take 1,000 mcg by mouth Daily.     • allopurinol (ZYLOPRIM) 100 MG tablet TAKE 1 TABLET EVERY DAY 90 tablet 3   • bumetanide (BUMEX) 2 MG tablet Take 1 tablet by mouth Daily. 90 tablet 3   • gabapentin (NEURONTIN) 300 MG capsule Take two capsules every morning and two capsules at bed time 120 capsule 2   • glipiZIDE (GLUCOTROL) 10 MG tablet TAKE 1 TABLET TWICE DAILY BEFORE MEALS 180 tablet 1   • lovastatin (MEVACOR) 40 MG tablet TAKE 1 TABLET EVERY NIGHT. 90 tablet 1     No facility-administered medications prior to visit.        Patient Active Problem List   Diagnosis   • Peripheral neuropathy   • Angina pectoris (CMS/HCC)   • Annual physical exam   • Aortic valve stenosis   • ASHD (arteriosclerotic heart disease)   • Benign  essential hypertension   • Chronic diastolic heart failure (CMS/[facility])   • Hypertension associated with stage 3 chronic kidney disease due to type 2 diabetes mellitus (CMS/[facility])   • Type 2 diabetes mellitus (CMS/HCC)   • Diabetic renal disease (CMS/[facility])   • Diabetic sensory polyneuropathy (CMS/[facility])   • Gout, unspecified   • Hyperlipidemia   • Knee pain, right   • Morbid obesity with BMI of 40.0-44.9, adult (CMS/[facility])   • Peripheral venous insufficiency   • Hypertensive heart disease   • Vitamin D deficiency   • Chronic kidney disease, stage III (moderate) (CMS/[facility])       Advance Care Planning:  ACP discussion was held with the patient during this visit. Patient has an advance directive (not in EMR), copy requested.    Identification of Risk Factors:  Risk factors include: Advance Directive Discussion  Immunizations Discussed/Encouraged (specific immunizations; Influenza )  Obesity/Overweight .    Review of Systems   Constitutional: Negative for chills, fatigue and fever.   HENT: Negative for congestion, ear pain and sinus pressure.    Respiratory: Negative for cough, chest tightness, shortness of breath and wheezing.    Cardiovascular: Negative for chest pain and palpitations.   Gastrointestinal: Negative for abdominal pain, blood in stool and constipation.   Skin: Negative for color change.   Allergic/Immunologic: Negative for environmental allergies.   Neurological: Negative for dizziness, speech difficulty and headaches.   Psychiatric/Behavioral: Negative for confusion. The patient is not nervous/anxious.        Compared to one year ago, the patient feels her physical health is the same.  Compared to one year ago, the patient feels her mental health is the same.    Objective     Physical Exam  Vitals signs and nursing note reviewed.   Constitutional:       Appearance: She is well-developed. She is obese.   HENT:      Head: Normocephalic and atraumatic.      Right Ear: External ear normal.      Left Ear: External ear  normal.      Nose: Nose normal.      Mouth/Throat:      Pharynx: No oropharyngeal exudate.   Eyes:      Conjunctiva/sclera: Conjunctivae normal.      Pupils: Pupils are equal, round, and reactive to light.   Neck:      Musculoskeletal: Normal range of motion and neck supple.      Thyroid: No thyromegaly.      Vascular: No JVD.   Cardiovascular:      Rate and Rhythm: Normal rate and regular rhythm.      Pulses:           Dorsalis pedis pulses are 1+ on the right side and 1+ on the left side.      Heart sounds: Normal heart sounds. No murmur. No friction rub. No gallop.    Pulmonary:      Effort: Pulmonary effort is normal. No respiratory distress.      Breath sounds: Normal breath sounds. No wheezing or rales.   Abdominal:      General: Bowel sounds are normal. There is no distension.      Palpations: Abdomen is soft. There is no mass.      Tenderness: There is no abdominal tenderness. There is no guarding or rebound.      Hernia: No hernia is present.   Musculoskeletal: Normal range of motion.         General: No tenderness.   Feet:      Right foot:      Protective Sensation: 3 sites tested. 3 sites sensed.      Left foot:      Protective Sensation: 3 sites tested. 3 sites sensed.      Skin integrity: Skin integrity normal.   Lymphadenopathy:      Cervical: No cervical adenopathy.   Skin:     General: Skin is warm and dry.      Findings: No erythema or rash.   Neurological:      Mental Status: She is alert and oriented to person, place, and time.      Cranial Nerves: No cranial nerve deficit.      Sensory: No sensory deficit.      Motor: No abnormal muscle tone.      Coordination: Coordination normal.      Deep Tendon Reflexes: Reflexes normal.   Psychiatric:         Behavior: Behavior normal.         Thought Content: Thought content normal.         Judgment: Judgment normal.          Procedures     Vitals:    09/16/20 1004   BP: 146/54   BP Location: Left arm   Patient Position: Sitting   Cuff Size: Large Adult  "  Pulse: 56   Temp: 97.3 °F (36.3 °C)   Weight: 112 kg (247 lb 9.6 oz)   Height: 156.2 cm (61.5\")   PainSc:   8   PainLoc: Leg       Patient's Body mass index is 46.03 kg/m². BMI is above normal parameters. Recommendations include: exercise counseling and nutrition counseling.      Assessment/Plan   Problem List Items Addressed This Visit        Cardiovascular and Mediastinum    Angina pectoris (CMS/HCC)    Relevant Medications    carvedilol (COREG) 25 MG tablet    Benign essential hypertension    Relevant Medications    carvedilol (COREG) 25 MG tablet    bumetanide (BUMEX) 2 MG tablet    Other Relevant Orders    CBC & Differential    Chronic diastolic heart failure (CMS/HCC)    Relevant Medications    carvedilol (COREG) 25 MG tablet    Hyperlipidemia    Relevant Medications    lovastatin (MEVACOR) 40 MG tablet    Other Relevant Orders    Lipid Panel       Digestive    Morbid obesity with BMI of 40.0-44.9, adult (CMS/HCC)    Vitamin D deficiency    Relevant Orders    Vitamin D 25 Hydroxy       Endocrine    Diabetic renal disease (CMS/HCC)    Relevant Medications    glipizide (GLUCOTROL) 10 MG tablet    bumetanide (BUMEX) 2 MG tablet    Diabetic sensory polyneuropathy (CMS/HCC)    Relevant Medications    glipizide (GLUCOTROL) 10 MG tablet    Other Relevant Orders    Comprehensive Metabolic Panel    Hemoglobin A1c    Microalbumin / Creatinine Urine Ratio - Urine, Clean Catch       Genitourinary    Chronic kidney disease, stage III (moderate) (CMS/HCC)    Relevant Medications    bumetanide (BUMEX) 2 MG tablet       Other    Annual physical exam - Primary    Gout, unspecified    Relevant Orders    Uric Acid      Other Visit Diagnoses     Neuropathy        Relevant Medications    gabapentin (NEURONTIN) 300 MG capsule        Patient Self-Management and Personalized Health Advice  The patient has been provided with information about: diet, exercise and weight management and preventive services including:   · Annual " Wellness Visit (AWV)  · Influenza Vaccine and Administration.    Outpatient Encounter Medications as of 9/16/2020   Medication Sig Dispense Refill   • albuterol sulfate HFA (VENTOLIN HFA) 108 (90 Base) MCG/ACT inhaler Inhale 2 puffs Daily As Needed.     • allopurinol (ZYLOPRIM) 100 MG tablet Take 1 tablet by mouth Daily. 90 tablet 3   • aspirin 81 MG EC tablet Take 81 mg by mouth Daily.     • Blood Gluc Meter Disp-Strips device 1 Device Daily. 1 each 0   • bumetanide (BUMEX) 2 MG tablet Take 1 tablet by mouth Daily. 90 tablet 3   • carvedilol (COREG) 25 MG tablet TAKE 1 TABLET TWICE DAILY WITH FOOD 180 tablet 3   • Cholecalciferol (VITAMIN D-3 PO) Take 1,000 Units by mouth Daily.     • fluticasone-salmeterol (ADVAIR) 100-50 MCG/DOSE DISKUS Inhale 1 puff 2 (Two) Times a Day As Needed.     • gabapentin (NEURONTIN) 300 MG capsule Take two capsules every morning and two capsules at bed time 360 capsule 0   • glipizide (GLUCOTROL) 10 MG tablet Take 1 tablet by mouth 2 (Two) Times a Day Before Meals. 180 tablet 3   • glucose blood (ACCU-CHEK IRAM PLUS) test strip Use daily 100 each 11   • lovastatin (MEVACOR) 40 MG tablet Take 1 tablet by mouth Every Night. 90 tablet 1   • vitamin B-12 (CYANOCOBALAMIN) 1000 MCG tablet Take 1,000 mcg by mouth Daily.     • [DISCONTINUED] allopurinol (ZYLOPRIM) 100 MG tablet TAKE 1 TABLET EVERY DAY 90 tablet 3   • [DISCONTINUED] bumetanide (BUMEX) 2 MG tablet Take 1 tablet by mouth Daily. 90 tablet 3   • [DISCONTINUED] gabapentin (NEURONTIN) 300 MG capsule Take two capsules every morning and two capsules at bed time 120 capsule 2   • [DISCONTINUED] glipiZIDE (GLUCOTROL) 10 MG tablet TAKE 1 TABLET TWICE DAILY BEFORE MEALS 180 tablet 1   • [DISCONTINUED] lovastatin (MEVACOR) 40 MG tablet TAKE 1 TABLET EVERY NIGHT. 90 tablet 1     No facility-administered encounter medications on file as of 9/16/2020.      Plan    Overall she is doing well without symptoms of heart disease.  She has gained  weight, which she attributes to COVID shutdown.  She will continue to work on weight loss.    Heart failure is well compensated on carvedilol, and bumetanide    20 artery disease is asymptomatic on carvedilol, aspirin, lovastatin.    BMI is 46 which easily meets criteria for morbid obesity.  Once again we discussed the importance of caloric restriction.    A1c is pending, she will continue glipizide, healthy diet, attempts at weight loss, and gabapentin for her neuropathy.  Blood pressure is well controlled on carvedilol.        Lipid panel is pending, continue lovastatin and healthy diet.    GFR is pending, the treatment remains control of blood pressure and blood sugar and avoidance of NSAIDs.    Gout is currently asymptomatic on allopurinol.      Reviewed use of high risk medication in the elderly: yes  Reviewed for potential of harmful drug interactions in the elderly: yes    Follow Up:  Return in about 6 months (around 3/16/2021) for follow up fasting.     There are no Patient Instructions on file for this visit.    An After Visit Summary and PPPS with all of these plans were given to the patient.

## 2020-09-17 LAB
25(OH)D3+25(OH)D2 SERPL-MCNC: 48 NG/ML (ref 30–100)
ALBUMIN SERPL-MCNC: 3.8 G/DL (ref 3.5–5.2)
ALBUMIN/CREAT UR: 42 MG/G CREAT (ref 0–29)
ALBUMIN/GLOB SERPL: 1.6 G/DL
ALP SERPL-CCNC: 103 U/L (ref 39–117)
ALT SERPL-CCNC: 14 U/L (ref 1–33)
AST SERPL-CCNC: 11 U/L (ref 1–32)
BASOPHILS # BLD AUTO: 0.05 10*3/MM3 (ref 0–0.2)
BASOPHILS NFR BLD AUTO: 0.6 % (ref 0–1.5)
BILIRUB SERPL-MCNC: 0.2 MG/DL (ref 0–1.2)
BUN SERPL-MCNC: 57 MG/DL (ref 8–23)
BUN/CREAT SERPL: 26.8 (ref 7–25)
CALCIUM SERPL-MCNC: 9 MG/DL (ref 8.6–10.5)
CHLORIDE SERPL-SCNC: 99 MMOL/L (ref 98–107)
CHOLEST SERPL-MCNC: 144 MG/DL (ref 0–200)
CO2 SERPL-SCNC: 30.3 MMOL/L (ref 22–29)
CREAT SERPL-MCNC: 2.13 MG/DL (ref 0.57–1)
CREAT UR-MCNC: 34.6 MG/DL
EOSINOPHIL # BLD AUTO: 0.21 10*3/MM3 (ref 0–0.4)
EOSINOPHIL NFR BLD AUTO: 2.4 % (ref 0.3–6.2)
ERYTHROCYTE [DISTWIDTH] IN BLOOD BY AUTOMATED COUNT: 13.8 % (ref 12.3–15.4)
GLOBULIN SER CALC-MCNC: 2.4 GM/DL
GLUCOSE SERPL-MCNC: 151 MG/DL (ref 65–99)
HBA1C MFR BLD: 7.5 % (ref 4.8–5.6)
HCT VFR BLD AUTO: 33.3 % (ref 34–46.6)
HDLC SERPL-MCNC: 48 MG/DL (ref 40–60)
HGB BLD-MCNC: 10.9 G/DL (ref 12–15.9)
IMM GRANULOCYTES # BLD AUTO: 0.03 10*3/MM3 (ref 0–0.05)
IMM GRANULOCYTES NFR BLD AUTO: 0.3 % (ref 0–0.5)
LDLC SERPL CALC-MCNC: 66 MG/DL (ref 0–100)
LYMPHOCYTES # BLD AUTO: 2.08 10*3/MM3 (ref 0.7–3.1)
LYMPHOCYTES NFR BLD AUTO: 23.6 % (ref 19.6–45.3)
MCH RBC QN AUTO: 29.5 PG (ref 26.6–33)
MCHC RBC AUTO-ENTMCNC: 32.7 G/DL (ref 31.5–35.7)
MCV RBC AUTO: 90 FL (ref 79–97)
MICROALBUMIN UR-MCNC: 14.4 UG/ML
MONOCYTES # BLD AUTO: 0.72 10*3/MM3 (ref 0.1–0.9)
MONOCYTES NFR BLD AUTO: 8.2 % (ref 5–12)
NEUTROPHILS # BLD AUTO: 5.73 10*3/MM3 (ref 1.7–7)
NEUTROPHILS NFR BLD AUTO: 64.9 % (ref 42.7–76)
NRBC BLD AUTO-RTO: 0 /100 WBC (ref 0–0.2)
PLATELET # BLD AUTO: 251 10*3/MM3 (ref 140–450)
POTASSIUM SERPL-SCNC: 5.3 MMOL/L (ref 3.5–5.2)
PROT SERPL-MCNC: 6.2 G/DL (ref 6–8.5)
RBC # BLD AUTO: 3.7 10*6/MM3 (ref 3.77–5.28)
SODIUM SERPL-SCNC: 145 MMOL/L (ref 136–145)
TRIGL SERPL-MCNC: 152 MG/DL (ref 0–150)
URATE SERPL-MCNC: 8.4 MG/DL (ref 2.4–5.7)
VLDLC SERPL CALC-MCNC: 30.4 MG/DL
WBC # BLD AUTO: 8.82 10*3/MM3 (ref 3.4–10.8)

## 2020-09-22 ENCOUNTER — TELEPHONE (OUTPATIENT)
Dept: INTERNAL MEDICINE | Facility: CLINIC | Age: 85
End: 2020-09-22

## 2020-09-22 DIAGNOSIS — N18.30 CHRONIC KIDNEY DISEASE, STAGE III (MODERATE) (HCC): Primary | ICD-10-CM

## 2020-09-22 NOTE — TELEPHONE ENCOUNTER
Daughter called back. They will need labs from 9/16 and order to repeat labs faxed to [phone] ( [facility] [[email or web address]])    Please put in order

## 2020-10-15 RX ORDER — BLOOD SUGAR DIAGNOSTIC
STRIP MISCELLANEOUS
Qty: 100 EACH | Refills: 5 | Status: SHIPPED | OUTPATIENT
Start: 2020-10-15

## 2020-11-07 ENCOUNTER — TELEPHONE (OUTPATIENT)
Dept: INTERNAL MEDICINE | Facility: CLINIC | Age: 85
End: 2020-11-07

## 2020-12-23 ENCOUNTER — TELEPHONE (OUTPATIENT)
Dept: INTERNAL MEDICINE | Facility: CLINIC | Age: 85
End: 2020-12-23

## 2020-12-23 NOTE — TELEPHONE ENCOUNTER
Consuelo is calling to let the practise know that the patient has recently passed away. She is asking for Dr Covarrubias or for Leighann to call her back to discuss some things.    Consuelo: [phone]